# Patient Record
Sex: FEMALE | NOT HISPANIC OR LATINO | Employment: FULL TIME | URBAN - METROPOLITAN AREA
[De-identification: names, ages, dates, MRNs, and addresses within clinical notes are randomized per-mention and may not be internally consistent; named-entity substitution may affect disease eponyms.]

---

## 2021-04-16 ENCOUNTER — BULK ORDERING (OUTPATIENT)
Dept: CASE MANAGEMENT | Facility: OTHER | Age: 31
End: 2021-04-16

## 2021-04-16 DIAGNOSIS — Z23 IMMUNIZATION DUE: ICD-10-CM

## 2021-09-22 NOTE — PROGRESS NOTES
New left Knee      Patient: Nieves Warner        YOB: 1990    Medical Record Number: [id number]        Chief Complaints: left knee pain      History of Present Illness: This is a 31-year-old female who presents complaining of left knee pain she states she was getting out of a infante twisted her knee heard a pop had significant pain she states it stated it did swell it got stiff it did improve some after that now it is intermittent she will have no pain at all than a subtle weightbearing and twisting she has severe pain her pain is primarily anterior she works for a [employer].  She did have pain growing up in the anterior aspect of her knees but that did improve with conservative management.  Symptoms are moderate intermittent aching worse with activity somewhat better with rest she is swelling bruising stiffness past medical history apart for high blood pressure        Allergies:   Allergies   Allergen Reactions   • Penicillins Hives       Medications:   Home Medications:  No current outpatient medications on file prior to visit.     No current facility-administered medications on file prior to visit.     Current Medications:  Scheduled Meds:  Continuous Infusions:No current facility-administered medications for this visit.    PRN Meds:.    Past Medical History:   Diagnosis Date   • History of high blood pressure         Past Surgical History:   Procedure Laterality Date   • APPENDECTOMY     • TONSILLECTOMY          Social History     Occupational History   • Not on file   Tobacco Use   • Smoking status: Former Smoker   • Smokeless tobacco: Never Used   Vaping Use   • Vaping Use: Never used   Substance and Sexual Activity   • Alcohol use: Yes     Comment: 4 per week   • Drug use: Yes     Types: Cocaine(coke), Marijuana   • Sexual activity: Defer      Social History     Social History Narrative   • Not on file        Family History   Problem Relation Age of Onset   • Cancer Mother         pancreatic  "  • Hypertension Mother    • Hypertension Father              Review of Systems: 14 point review of systems run for the knee pain only remainder negative per the patient    Review of Systems      Physical Exam: 31 y.o. female  General Appearance:    Alert, cooperative, in no acute distress                   Vitals:    09/24/21 1035   Temp: 98.2 °F (36.8 °C)   Weight: 63.5 kg (140 lb)   Height: 170.2 cm (67\")   PainSc:   4      Patient is alert and read ×3 no acute distress appears her above-listed at height weight and age.  Affect is normal respiratory rate is normal unlabored. Heart rate regular rate rhythm, sclera, dentition and hearing are normal for the purpose of this exam.        Ortho Exam Physical exam of the left knee reveals no effusion, no erythema.  The patient has no palpable tenderness along the medial joint line, no tenderness about the lateral joint line.  Patient does have crepitus with patellofemoral range of motion.  They also have subjective symptoms anteriorly during exam.  The patient has a negative bounce home, negative Ivan and a stable ligamentous exam.  Quad tone is reasonable and symmetric.  There are no overlying skin changes no lymphedema no lymphadenopathy.  There is good hip range of motion which is full symmetric and asymptomatic and a normal ankle exam.  Hamstrings and IT band are tight bilaterally.  She does have pes planus on double leg standing    Procedures             Radiology:   AP, Lateral and merchant views of the left knee  were ordered/reviewed to evauateknee pain.  I have no comparative films she has patella og and a very shallow trochlear groove.  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  Imaging Results (Most Recent)     Procedure Component Value Units Date/Time    XR Knee 3 View Left [[id number]] Resulted: 09/24/21 0929     Updated: 09/24/21 1027    Impression:      Ordering " physician's impression is located in the Encounter Note dated 09/24/21. X-ray performed in the DR room.          Assessment/Plan:      Left knee patellofemoral issues.  I think she has patella og and fairly flat trochlear groove which gives rise to some instability at the patellofemoral joint I think quad and core strengthening is recently getting her foot up is a better position is reasonable and then will show her a J brace.  Do not think this is surgical issue at this time I would treat this conservatively

## 2021-09-24 ENCOUNTER — OFFICE VISIT (OUTPATIENT)
Dept: ORTHOPEDIC SURGERY | Facility: CLINIC | Age: 31
End: 2021-09-24

## 2021-09-24 VITALS — WEIGHT: 140 LBS | TEMPERATURE: 98.2 F | BODY MASS INDEX: 21.97 KG/M2 | HEIGHT: 67 IN

## 2021-09-24 DIAGNOSIS — M25.862 PATELLOFEMORAL DYSFUNCTION OF LEFT KNEE: ICD-10-CM

## 2021-09-24 DIAGNOSIS — M25.562 LEFT KNEE PAIN, UNSPECIFIED CHRONICITY: Primary | ICD-10-CM

## 2021-09-24 PROCEDURE — 99203 OFFICE O/P NEW LOW 30 MIN: CPT | Performed by: ORTHOPAEDIC SURGERY

## 2021-09-24 PROCEDURE — 73562 X-RAY EXAM OF KNEE 3: CPT | Performed by: ORTHOPAEDIC SURGERY
